# Patient Record
Sex: FEMALE | Race: WHITE | HISPANIC OR LATINO
[De-identification: names, ages, dates, MRNs, and addresses within clinical notes are randomized per-mention and may not be internally consistent; named-entity substitution may affect disease eponyms.]

---

## 2021-09-23 ENCOUNTER — NON-APPOINTMENT (OUTPATIENT)
Age: 86
End: 2021-09-23

## 2021-09-23 PROBLEM — Z00.00 ENCOUNTER FOR PREVENTIVE HEALTH EXAMINATION: Status: ACTIVE | Noted: 2021-09-23

## 2021-09-24 ENCOUNTER — NON-APPOINTMENT (OUTPATIENT)
Age: 86
End: 2021-09-24

## 2021-09-24 ENCOUNTER — APPOINTMENT (OUTPATIENT)
Dept: ENDOCRINOLOGY | Facility: CLINIC | Age: 86
End: 2021-09-24
Payer: MEDICARE

## 2021-09-24 VITALS
SYSTOLIC BLOOD PRESSURE: 128 MMHG | HEART RATE: 90 BPM | WEIGHT: 118 LBS | DIASTOLIC BLOOD PRESSURE: 74 MMHG | BODY MASS INDEX: 23.16 KG/M2 | HEIGHT: 60 IN

## 2021-09-24 DIAGNOSIS — E03.9 HYPOTHYROIDISM, UNSPECIFIED: ICD-10-CM

## 2021-09-24 PROCEDURE — 99204 OFFICE O/P NEW MOD 45 MIN: CPT | Mod: GC

## 2021-09-24 NOTE — HISTORY OF PRESENT ILLNESS
[FreeTextEntry1] : 89 year old female presenting from [facility] to establish care for hypothyroidism.\par \par PMHx: Vascular Dementia, Hypothyroidism, HLD\par \par Hypothryoidism History:\par \par Patient diagnosed with hypothyroidism 30+ year ago, reports she had overactive thyroid.  She was given radioactive iodine treatment for ablation, denies thyroidectomy. She has been on Synthroid therapy since. She has been taking 88 mcg for a while until her hospitalization this past aug 2021 where after lab work, the dose was reduced to 75mcg.\par At this time patient denies cold intolerance or excessive fatigue. According to the daughter patient has been very fixated on not being cold and has been drinking water excessively. 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

## 2021-09-24 NOTE — ADDENDUM
[FreeTextEntry1] : THe patient was seen and evaluated with Dr. Tucker,Endocrinlogy Fellow.She has post-ablative hypothyroidism. She has problems with short-term memory.Her Levothyroxine was recently reduced but no new TSH/Free T4 is available.\par     THyroid is not palpable lungs are clear .she has no murmur. She is in a wheelchair,recuperating from pelvic fracture. 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 she is on fluid restriction to 1500 cc daily, which is inadequate fluid restriction. For this reason she is receiving normal saline by IV in the Rehab center.\par     Thyroid function tests are being done today. The upper limit for TSH in a person this age is 7.0.

## 2021-09-24 NOTE — ASSESSMENT
[FreeTextEntry1] : 89 year old female here for care of hypothyroidism\par \par Patient Euthyroid on exam \par Currently on 75 mcg levothyroxine daily - being given to her at the rehab \par WIll need to recheck TSH, Free T4 levels to determine dosage adjustment \par Considering age will keep goal TSH 7\par \par No prior labs on file or provided of prior TSH, FT4 levels \par \par WIll get in touch with patient's daughter and facility about dosage adjustment \par \par RTC 6 month

## 2021-09-24 NOTE — PHYSICAL EXAM
[Alert] : alert [Well Nourished] : well nourished [No Acute Distress] : no acute distress [Well Developed] : well developed [Normal Sclera/Conjunctiva] : normal sclera/conjunctiva [EOMI] : extra ocular movement intact [No Proptosis] : no proptosis [Normal Oropharynx] : the oropharynx was normal [Thyroid Not Enlarged] : the thyroid was not enlarged [No Thyroid Nodules] : no palpable thyroid nodules [No Respiratory Distress] : no respiratory distress [No Accessory Muscle Use] : no accessory muscle use [Clear to Auscultation] : lungs were clear to auscultation bilaterally [Normal S1, S2] : normal S1 and S2 [Normal Rate] : heart rate was normal [Regular Rhythm] : with a regular rhythm [No Edema] : no peripheral edema [Pedal Pulses Normal] : the pedal pulses are present [Normal Bowel Sounds] : normal bowel sounds [Not Tender] : non-tender [Not Distended] : not distended [Soft] : abdomen soft [Normal Anterior Cervical Nodes] : no anterior cervical lymphadenopathy [Normal Posterior Cervical Nodes] : no posterior cervical lymphadenopathy [No Spinal Tenderness] : no spinal tenderness [Spine Straight] : spine straight [No Stigmata of Cushings Syndrome] : no stigmata of Cushings Syndrome [Normal Strength/Tone] : muscle strength and tone were normal [Normal Gait] : normal gait [No Rash] : no rash [Normal Reflexes] : deep tendon reflexes were 2+ and symmetric [No Tremors] : no tremors [Oriented x3] : oriented to person, place, and time [Acanthosis Nigricans] : no acanthosis nigricans [de-identified] : in wheelchair  [de-identified] : IV line in place on left forearm

## 2021-10-02 LAB
ESTIMATED AVERAGE GLUCOSE: 120 MG/DL
HBA1C MFR BLD HPLC: 5.8 %
T3 SERPL-MCNC: 79 NG/DL
T4 FREE SERPL-MCNC: 2.1 NG/DL
TSH SERPL-ACNC: 1.51 UIU/ML